# Patient Record
Sex: MALE | Race: AMERICAN INDIAN OR ALASKA NATIVE
[De-identification: names, ages, dates, MRNs, and addresses within clinical notes are randomized per-mention and may not be internally consistent; named-entity substitution may affect disease eponyms.]

---

## 2018-05-15 ENCOUNTER — HOSPITAL ENCOUNTER (EMERGENCY)
Dept: HOSPITAL 5 - ED | Age: 43
Discharge: HOME | End: 2018-05-15
Payer: SELF-PAY

## 2018-05-15 VITALS — SYSTOLIC BLOOD PRESSURE: 137 MMHG | DIASTOLIC BLOOD PRESSURE: 89 MMHG

## 2018-05-15 DIAGNOSIS — J18.1: Primary | ICD-10-CM

## 2018-05-15 DIAGNOSIS — I10: ICD-10-CM

## 2018-05-15 DIAGNOSIS — F17.200: ICD-10-CM

## 2018-05-15 LAB
BUN SERPL-MCNC: 6 MG/DL (ref 9–20)
BUN/CREAT SERPL: 9 %
CALCIUM SERPL-MCNC: 9.3 MG/DL (ref 8.4–10.2)
HCT VFR BLD CALC: 36.6 % (ref 35.5–45.6)
HEMOLYSIS INDEX: 2
HGB BLD-MCNC: 12.7 GM/DL (ref 11.8–15.2)
MCH RBC QN AUTO: 33 PG (ref 28–32)
MCHC RBC AUTO-ENTMCNC: 34 % (ref 32–34)
MCV RBC AUTO: 96 FL (ref 84–94)
PLATELET # BLD: 428 K/MM3 (ref 140–440)
RBC # BLD AUTO: 3.82 M/MM3 (ref 3.65–5.03)

## 2018-05-15 PROCEDURE — 36415 COLL VENOUS BLD VENIPUNCTURE: CPT

## 2018-05-15 PROCEDURE — 93010 ELECTROCARDIOGRAM REPORT: CPT

## 2018-05-15 PROCEDURE — 85027 COMPLETE CBC AUTOMATED: CPT

## 2018-05-15 PROCEDURE — 71046 X-RAY EXAM CHEST 2 VIEWS: CPT

## 2018-05-15 PROCEDURE — 99284 EMERGENCY DEPT VISIT MOD MDM: CPT

## 2018-05-15 PROCEDURE — 93005 ELECTROCARDIOGRAM TRACING: CPT

## 2018-05-15 PROCEDURE — 80048 BASIC METABOLIC PNL TOTAL CA: CPT

## 2018-05-15 NOTE — EMERGENCY DEPARTMENT REPORT
- General


Chief Complaint: Upper Respiratory Infection


Stated Complaint: CP


Time Seen by Provider: 05/15/18 22:32


Source: patient


Mode of arrival: Ambulatory


Limitations: No Limitations





- History of Present Illness


Initial Comments: 





43-year-old -American male comes to the emergency room complaining of 

persistent cough with green mucus and coughing so much that his right ribs 

hurt.  Patient denies any fever.  He reports that this certainly gotten worse 

in the last day.  He has been suffering from a persistent cough for 1 week.  

Patient reports a past medical history of hypertension but does not take any 

medications as he tries to do diet control and exercise.


MD Complaint: cough


-: week(s) (1)


Severity: mild


Severity scale (0 -10): 9


Quality: sharp


Consistency: intermittent


Improves With: nothing


Worsens With: activity, deep breaths


Associated Symptoms: cough


Treatments Prior to Arrival: none





- Related Data


 Previous Rx's











 Medication  Instructions  Recorded  Last Taken  Type


 


ALBUTEROL Inhaler [ProAir HFA 2 puff IH QID PRN #1 inhalation 05/15/18 Unknown 

Rx





Inhaler]    


 


Ibuprofen [Motrin 800 MG tab] 800 mg PO Q8HR PRN #30 tablet 05/15/18 Unknown Rx


 


Levofloxacin [Levaquin] 750 mg PO QDAY #10 tablet 05/15/18 Unknown Rx











 Allergies











Allergy/AdvReac Type Severity Reaction Status Date / Time


 


No Known Allergies Allergy   Unverified 05/15/18 19:40














ED Review of Systems


ROS: 


Stated complaint: CP


Other details as noted in HPI





Comment: All other systems reviewed and negative


Constitutional: denies: chills, fever


Respiratory: cough, shortness of breath, SOB with exertion


Cardiovascular: chest pain (right-sided rib pain), dyspnea on exertion





ED Past Medical Hx





- Past Medical History


Hx Hypertension: Yes





- Social History


Smoking Status: Current Every Day Smoker


Substance Use Type: Alcohol





- Medications


Home Medications: 


 Home Medications











 Medication  Instructions  Recorded  Confirmed  Last Taken  Type


 


ALBUTEROL Inhaler [ProAir HFA 2 puff IH QID PRN #1 inhalation 05/15/18  Unknown 

Rx





Inhaler]     


 


Ibuprofen [Motrin 800 MG tab] 800 mg PO Q8HR PRN #30 tablet 05/15/18  Unknown Rx


 


Levofloxacin [Levaquin] 750 mg PO QDAY #10 tablet 05/15/18  Unknown Rx














ED Physical Exam





- General


Limitations: No Limitations


General appearance: alert, in no apparent distress





- Head


Head exam: Present: atraumatic, normocephalic





- Eye


Eye exam: Present: normal appearance





- ENT


ENT exam: Present: mucous membranes moist





- Cardiovascular


Cardiovascular Exam: Present: tachycardia





- GI/Abdominal


GI/Abdominal exam: Present: soft, normal bowel sounds





- Extremities Exam


Extremities exam: Present: normal inspection





- Back Exam


Back exam: Present: normal inspection





- Neurological Exam


Neurological exam: Present: alert, oriented X3





- Psychiatric


Psychiatric exam: Present: normal affect, normal mood





- Skin


Skin exam: Present: warm, dry, intact, normal color.  Absent: rash





ED Course





 Vital Signs











  05/15/18





  19:36


 


Temperature 98.3 F


 


Pulse Rate 112 H


 


Respiratory 20





Rate 


 


Blood Pressure 137/89


 


O2 Sat by Pulse 95





Oximetry 














ED Medical Decision Making





- Lab Data


Result diagrams: 


 05/15/18 19:47





 05/15/18 19:47





- Radiology Data


Radiology results: report reviewed, image reviewed





FINAL REPORT 





PROCEDURE: XR CHEST ROUTINE 2V 





TECHNIQUE: A portable AP chest radiograph was obtained at 


05/15/2018 19:47 (EST) . CPT 26252 











HISTORY: persistant cough and pain 





COMPARISON: No prior studies are available for comparison. 





FINDINGS: 


Heart: Normal. 





Mediastinum/Vessels: Normal. 





Lungs/Pleural space: There is right middle lobe infiltrate and 


atelectasis. There are no effusions or pneumothoraces.. 





Bony thorax: No acute osseous abnormality. 





Life support devices: None. 





IMPRESSION: 


Right middle lobe infiltrate and atelectasis.. 











Transcribed By: CO 


Dictated By: MARLENY MACKEY MD 


Electronically Authenticated By: MARLENY MACKEY MD 


Signed Date/Time: 05/15/18 2012 











DD/DT: 05/15/18 2012 


TD/TT: 05/15/18 2012





- Medical Decision Making





Patient's been evaluated by this provider fast track.  This patient that we'll 

place him on antibiotics and give her Rocephin injection here in the emergency 

room.  Discussed the patient that he can take ibuprofen for pain.  It is 

important that he follow up with her primary care provider.  Patient verbalized 

understanding. 


Critical care attestation.: 


If time is entered above; I have spent that time in minutes in the direct care 

of this critically ill patient, excluding procedure time.








ED Disposition


Clinical Impression: 


Right middle lobe pneumonia


Qualifiers:


 Pneumonia type: due to unspecified organism Qualified Code(s): J18.1 - Lobar 

pneumonia, unspecified organism





Disposition: DC-01 TO HOME OR SELFCARE


Is pt being admited?: No


Does the pt Need Aspirin: No


Condition: Stable


Instructions:  Bacterial Pneumonia (ED)


Additional Instructions: 


Please complete antibiotics as prescribed.  This importantly to follow up with 

[facility] or primary care provider for follow-up evaluation 

from pneumonia.


Prescriptions: 


ALBUTEROL Inhaler [ProAir HFA Inhaler] 2 puff IH QID PRN #1 inhalation


 PRN Reason: Shortness Of Breath


Ibuprofen [Motrin 800 MG tab] 800 mg PO Q8HR PRN #30 tablet


 PRN Reason: Pain


Levofloxacin [Levaquin] 750 mg PO QDAY #10 tablet


Referrals: 


[facility] [Provider Group] - 3-5 Days


Forms:  Work/School Release Form(ED)

## 2018-05-15 NOTE — XRAY REPORT
FINAL REPORT



PROCEDURE:  XR CHEST ROUTINE 2V



TECHNIQUE:  A portable AP chest radiograph was obtained at

05/15/2018 19:47 (EST) . CPT 08053







HISTORY:  persistant cough and pain 



COMPARISON:  No prior studies are available for comparison.



FINDINGS:  

Heart: Normal.



Mediastinum/Vessels: Normal.



Lungs/Pleural space: There is right middle lobe infiltrate and

atelectasis. There are no effusions or pneumothoraces..



Bony thorax: No acute osseous abnormality.



Life support devices: None.



IMPRESSION:  

Right middle lobe infiltrate and atelectasis..